# Patient Record
Sex: FEMALE | Race: WHITE | NOT HISPANIC OR LATINO | Employment: UNEMPLOYED | URBAN - NONMETROPOLITAN AREA
[De-identification: names, ages, dates, MRNs, and addresses within clinical notes are randomized per-mention and may not be internally consistent; named-entity substitution may affect disease eponyms.]

---

## 2017-08-15 ENCOUNTER — OFFICE VISIT (OUTPATIENT)
Dept: FAMILY MEDICINE CLINIC | Facility: CLINIC | Age: 27
End: 2017-08-15

## 2017-08-15 ENCOUNTER — LAB (OUTPATIENT)
Dept: LAB | Facility: OTHER | Age: 27
End: 2017-08-15

## 2017-08-15 VITALS
DIASTOLIC BLOOD PRESSURE: 80 MMHG | SYSTOLIC BLOOD PRESSURE: 126 MMHG | TEMPERATURE: 96 F | HEART RATE: 114 BPM | OXYGEN SATURATION: 98 % | HEIGHT: 65 IN | WEIGHT: 159 LBS | BODY MASS INDEX: 26.49 KG/M2

## 2017-08-15 DIAGNOSIS — F41.1 GAD (GENERALIZED ANXIETY DISORDER): Primary | ICD-10-CM

## 2017-08-15 DIAGNOSIS — N80.9 ENDOMETRIOSIS: ICD-10-CM

## 2017-08-15 DIAGNOSIS — F41.1 GAD (GENERALIZED ANXIETY DISORDER): ICD-10-CM

## 2017-08-15 DIAGNOSIS — E66.3 OVERWEIGHT (BMI 25.0-29.9): ICD-10-CM

## 2017-08-15 LAB
ALBUMIN SERPL-MCNC: 3.9 G/DL (ref 3.2–5.5)
ALBUMIN/GLOB SERPL: 1 G/DL (ref 1–3)
ALP SERPL-CCNC: 45 U/L (ref 15–121)
ALT SERPL W P-5'-P-CCNC: 32 U/L (ref 10–60)
ANION GAP SERPL CALCULATED.3IONS-SCNC: 10 MMOL/L (ref 5–15)
AST SERPL-CCNC: 24 U/L (ref 10–60)
BASOPHILS # BLD AUTO: 0.03 10*3/MM3 (ref 0–0.2)
BASOPHILS NFR BLD AUTO: 0.3 % (ref 0–2)
BILIRUB SERPL-MCNC: 0.7 MG/DL (ref 0.2–1)
BUN BLD-MCNC: 6 MG/DL (ref 8–25)
BUN/CREAT SERPL: 8.6 (ref 7–25)
CALCIUM SPEC-SCNC: 9.4 MG/DL (ref 8.4–10.8)
CHLORIDE SERPL-SCNC: 101 MMOL/L (ref 100–112)
CO2 SERPL-SCNC: 28 MMOL/L (ref 20–32)
CREAT BLD-MCNC: 0.7 MG/DL (ref 0.4–1.3)
DEPRECATED RDW RBC AUTO: 41.4 FL (ref 36.4–46.3)
EOSINOPHIL # BLD AUTO: 0.23 10*3/MM3 (ref 0–0.7)
EOSINOPHIL NFR BLD AUTO: 2.5 % (ref 0–7)
ERYTHROCYTE [DISTWIDTH] IN BLOOD BY AUTOMATED COUNT: 13.8 % (ref 11.5–14.5)
GFR SERPL CREATININE-BSD FRML MDRD: 100 ML/MIN/1.73 (ref 71–165)
GLOBULIN UR ELPH-MCNC: 3.8 GM/DL (ref 2.5–4.6)
GLUCOSE BLD-MCNC: 108 MG/DL (ref 70–100)
HCT VFR BLD AUTO: 38.6 % (ref 35–45)
HGB BLD-MCNC: 13.3 G/DL (ref 12–15.5)
LYMPHOCYTES # BLD AUTO: 1.53 10*3/MM3 (ref 0.6–4.2)
LYMPHOCYTES NFR BLD AUTO: 16.3 % (ref 10–50)
MCH RBC QN AUTO: 28.9 PG (ref 26.5–34)
MCHC RBC AUTO-ENTMCNC: 34.5 G/DL (ref 31.4–36)
MCV RBC AUTO: 83.9 FL (ref 80–98)
MONOCYTES # BLD AUTO: 0.69 10*3/MM3 (ref 0–0.9)
MONOCYTES NFR BLD AUTO: 7.4 % (ref 0–12)
NEUTROPHILS # BLD AUTO: 6.89 10*3/MM3 (ref 2–8.6)
NEUTROPHILS NFR BLD AUTO: 73.5 % (ref 37–80)
PLATELET # BLD AUTO: 305 10*3/MM3 (ref 150–450)
PMV BLD AUTO: 10 FL (ref 8–12)
POTASSIUM BLD-SCNC: 3.8 MMOL/L (ref 3.4–5.4)
PROT SERPL-MCNC: 7.7 G/DL (ref 6.7–8.2)
RBC # BLD AUTO: 4.6 10*6/MM3 (ref 3.77–5.16)
SODIUM BLD-SCNC: 139 MMOL/L (ref 134–146)
WBC NRBC COR # BLD: 9.37 10*3/MM3 (ref 3.2–9.8)

## 2017-08-15 PROCEDURE — 99214 OFFICE O/P EST MOD 30 MIN: CPT | Performed by: FAMILY MEDICINE

## 2017-08-15 PROCEDURE — 85025 COMPLETE CBC W/AUTO DIFF WBC: CPT | Performed by: FAMILY MEDICINE

## 2017-08-15 PROCEDURE — 84439 ASSAY OF FREE THYROXINE: CPT | Performed by: FAMILY MEDICINE

## 2017-08-15 PROCEDURE — 84443 ASSAY THYROID STIM HORMONE: CPT | Performed by: FAMILY MEDICINE

## 2017-08-15 PROCEDURE — 80053 COMPREHEN METABOLIC PANEL: CPT | Performed by: FAMILY MEDICINE

## 2017-08-15 PROCEDURE — 36415 COLL VENOUS BLD VENIPUNCTURE: CPT | Performed by: FAMILY MEDICINE

## 2017-08-15 RX ORDER — CITALOPRAM 20 MG/1
20 TABLET ORAL DAILY
Qty: 30 TABLET | Refills: 5 | Status: SHIPPED | OUTPATIENT
Start: 2017-08-15 | End: 2018-10-15

## 2017-08-15 NOTE — PROGRESS NOTES
Subjective   Chief Complaint   Patient presents with   • Establish Care     endometriosis     Janae Dumont is a 26 y.o. female.   Establish Care (endometriosis)    Here to establish care today    Would like to talk about anxiety and endometriosis    Depression   Visit Type: initial  Patient presents with the following symptoms: anhedonia, decreased concentration, fatigue, insomnia, irritability, memory impairment, muscle tension, nervousness/anxiety and obsessions.  Patient is not experiencing: depressed mood, excessive worry, feelings of hopelessness, feelings of worthlessness, nausea, palpitations, panic, restlessness, shortness of breath, suicidal ideas, suicidal planning, thoughts of death, weight gain and weight loss.  Frequency of symptoms: occasionally   Sleep per night: 6 hours  Sleep quality: fair  Nighttime awakenings: several  Risk factors: major life event    was previously started on wellbutrin  Voluntarily chose to not start new medication over 3 months ago    Would like a referral to gynecology for endometriosis  Previously followed by Dr Escalera    The following portions of the patient's history were reviewed and updated as appropriate: allergies, current medications, past family history, past medical history, past social history, past surgical history and problem list.    Review of Systems   Constitutional: Positive for irritability. Negative for appetite change, chills, fatigue, fever, weight gain and weight loss.   HENT: Negative for congestion, ear pain, rhinorrhea and sore throat.    Eyes: Negative for pain.   Respiratory: Negative for cough and shortness of breath.    Cardiovascular: Negative for chest pain and palpitations.   Gastrointestinal: Negative for abdominal pain, constipation, diarrhea and nausea.   Genitourinary: Positive for pelvic pain. Negative for dysuria.   Musculoskeletal: Negative for back pain, joint swelling and neck pain.   Skin: Negative for rash.   Neurological:  "Negative for dizziness and headaches.   Psychiatric/Behavioral: Positive for decreased concentration. Negative for suicidal ideas. The patient is nervous/anxious and has insomnia.        Objective   /80  Pulse 114  Temp 96 °F (35.6 °C)  Ht 65\" (165.1 cm)  Wt 159 lb (72.1 kg)  LMP 07/11/2017  SpO2 98%  BMI 26.46 kg/m2  Physical Exam   Constitutional: She is oriented to person, place, and time. She appears well-developed and well-nourished.   HENT:   Head: Normocephalic and atraumatic.   Eyes: Pupils are equal, round, and reactive to light.   Neck: Normal range of motion. Neck supple.   Cardiovascular: Regular rhythm and normal heart sounds.  Tachycardia present.    Pulmonary/Chest: Effort normal and breath sounds normal. No respiratory distress. She has no wheezes. She has no rales.   Abdominal: Soft. Bowel sounds are normal. There is tenderness in the suprapubic area.   Musculoskeletal: Normal range of motion.   Neurological: She is alert and oriented to person, place, and time.   Skin: Skin is warm and dry.   Psychiatric: Her mood appears anxious.   Nursing note and vitals reviewed.      Assessment/Plan   Problems Addressed this Visit     None      Visit Diagnoses     RENEE (generalized anxiety disorder)    -  Primary    Relevant Medications    citalopram (CELEXA) 20 MG tablet    Other Relevant Orders    Comprehensive Metabolic Panel    CBC & Differential (Completed)    TSH    T4, Free    CBC Auto Differential (Completed)    Endometriosis        Relevant Orders    Ambulatory Referral to Gynecology    Overweight (BMI 25.0-29.9)            Labs ordered    Medical release signed for medical records    Referred to georgi Talbert - needs a papsmear and evaluation for endometriosis    Start celexa    Recommended regular exercise, diet, weight loss    Recheck in 4 -6 weeks         "

## 2017-08-16 LAB
T4 FREE SERPL-MCNC: 1.12 NG/DL (ref 0.78–2.19)
TSH SERPL DL<=0.05 MIU/L-ACNC: 1.01 MIU/ML (ref 0.46–4.68)

## 2017-08-23 ENCOUNTER — OFFICE VISIT (OUTPATIENT)
Dept: OBSTETRICS AND GYNECOLOGY | Facility: CLINIC | Age: 27
End: 2017-08-23

## 2017-08-23 VITALS
RESPIRATION RATE: 14 BRPM | SYSTOLIC BLOOD PRESSURE: 122 MMHG | HEIGHT: 65 IN | WEIGHT: 158.6 LBS | BODY MASS INDEX: 26.42 KG/M2 | DIASTOLIC BLOOD PRESSURE: 78 MMHG | HEART RATE: 75 BPM

## 2017-08-23 DIAGNOSIS — Z87.42 HISTORY OF ENDOMETRIOSIS: ICD-10-CM

## 2017-08-23 DIAGNOSIS — Z87.42 HISTORY OF OVARIAN CYST: ICD-10-CM

## 2017-08-23 DIAGNOSIS — B96.89 BV (BACTERIAL VAGINOSIS): ICD-10-CM

## 2017-08-23 DIAGNOSIS — R10.2 PELVIC PAIN: Primary | ICD-10-CM

## 2017-08-23 DIAGNOSIS — N76.0 BV (BACTERIAL VAGINOSIS): ICD-10-CM

## 2017-08-23 PROCEDURE — 99213 OFFICE O/P EST LOW 20 MIN: CPT | Performed by: NURSE PRACTITIONER

## 2017-08-23 PROCEDURE — 87210 SMEAR WET MOUNT SALINE/INK: CPT | Performed by: NURSE PRACTITIONER

## 2017-08-23 RX ORDER — METRONIDAZOLE 500 MG/1
500 TABLET ORAL 2 TIMES DAILY
Qty: 14 TABLET | Refills: 0 | Status: SHIPPED | OUTPATIENT
Start: 2017-08-23 | End: 2017-08-30

## 2017-08-23 NOTE — PROGRESS NOTES
Subjective   Janae Dumont is a 26 y.o. female.     History of Present Illness   Pt presents with complaints of left sided pelvic pain that has progressively been getting worse. Pt states she was diagnosed with ovarian cysts and Endometriosis a few years ago. She was never put on anything to help control these. Pt has had BTL in 2012. Periods are very irregular since BTL. Last 5-7 days, heavy flow with extreme pain. Today rates pain 2/10. Describes pain as constant but worse some days than others. Deep, sharp, worsened by intercourse in certain positions. NSAIDs and tylenol do not improve pain.     Patient's last menstrual period was 07/11/2017 (approximate).  Pt notes that last period was a darker color and odor present. Refuses need for STI testing.     The following portions of the patient's history were reviewed and updated as appropriate: allergies, current medications, past family history, past medical history, past social history, past surgical history and problem list.    Review of Systems   Constitutional: Negative.    Respiratory: Negative.    Cardiovascular: Negative.    Gastrointestinal: Negative.  Negative for abdominal pain, blood in stool, constipation and diarrhea.        Denies painful bowel movements   Genitourinary: Positive for dyspareunia, menstrual problem, pelvic pain and vaginal discharge. Negative for dysuria, enuresis, frequency, vaginal bleeding and vaginal pain.   Neurological: Negative for dizziness, syncope, light-headedness and headaches.   Psychiatric/Behavioral: Negative for suicidal ideas. The patient is not nervous/anxious (controlled with Celexa).        Objective   Physical Exam   Constitutional: She is oriented to person, place, and time. She appears well-developed and well-nourished.   Cardiovascular: Normal rate, regular rhythm and normal heart sounds.    Pulmonary/Chest: Effort normal and breath sounds normal.   Abdominal: Soft. Normal appearance and bowel sounds are  normal. There is tenderness in the left lower quadrant.   Genitourinary: Uterus normal. There is no rash, tenderness or lesion on the right labia. There is no rash, tenderness or lesion on the left labia. Cervix exhibits no motion tenderness. Right adnexum displays no mass, no tenderness and no fullness. Left adnexum displays tenderness. Left adnexum displays no mass and no fullness. No erythema, tenderness or bleeding in the vagina. No foreign body in the vagina. Vaginal discharge (moderate amount of thin white vaginal discharge, odor noted, wet prep obtained) found.   Genitourinary Comments: Wet prep results: positive for clue cells TNTC, no yeast buds, hyphae or trichomonads. Evaluated by JOHNNY Harvey   Neurological: She is alert and oriented to person, place, and time.   Psychiatric: She has a normal mood and affect. Her behavior is normal.   Vitals reviewed.      Assessment/Plan   Janae was seen today for pelvic pain.    Diagnoses and all orders for this visit:    Pelvic pain  -     US Non-ob Transvaginal    History of endometriosis  -     US Non-ob Transvaginal    History of ovarian cyst  -     US Non-ob Transvaginal    BV (bacterial vaginosis)  -     metroNIDAZOLE (FLAGYL) 500 MG tablet; Take 1 tablet by mouth 2 (Two) Times a Day for 7 days. No alcohol up to 48 hours after last dose       Reviewed vulvar hygiene. Discussed importance in prevention of BV. Flagyl 500mg BID x 7 days no alcohol or intercourse while on medication up to 2 days after last dose. Pt verbalizes understanding.     Obtain TVUS for further evaluation of pelvic pain. Consider beginning OCP to regulate periods and reduce endometriosis and ovarian cysts. Pt is agreeable to plan of care. I will call with US results and discuss next steps and medications PRN. Consider surgical consult referral PRN. NSAIDs PRN for pain.

## 2017-09-01 RX ORDER — DESOGESTREL AND ETHINYL ESTRADIOL 21-5 (28)
1 KIT ORAL DAILY
Qty: 28 TABLET | Refills: 12 | Status: SHIPPED | OUTPATIENT
Start: 2017-09-01 | End: 2018-09-01

## 2017-09-01 NOTE — PROGRESS NOTES
US normal. Suspected Endometriosis. Pt agrees to start OCP. If no improvement consider surgical referral for endometrial ablation. She was instructed how to start her birth control.  It should be started on Sunday following the onset of her next cycle.  The potential for breakthrough bleeding for up to 3 cycles was also emphasized. Discussed what to do if 1 and 2 or more days of pills are missed. Patient verbalizes understanding. All questions were answered.

## 2018-10-15 ENCOUNTER — OFFICE VISIT (OUTPATIENT)
Dept: OBSTETRICS AND GYNECOLOGY | Facility: CLINIC | Age: 28
End: 2018-10-15

## 2018-10-15 VITALS
HEART RATE: 98 BPM | SYSTOLIC BLOOD PRESSURE: 140 MMHG | WEIGHT: 183.2 LBS | DIASTOLIC BLOOD PRESSURE: 80 MMHG | HEIGHT: 65 IN | RESPIRATION RATE: 16 BRPM | BODY MASS INDEX: 30.52 KG/M2

## 2018-10-15 DIAGNOSIS — R61 SWEATING INCREASE: ICD-10-CM

## 2018-10-15 DIAGNOSIS — Z01.419 ENCOUNTER FOR GYNECOLOGICAL EXAMINATION WITH PAPANICOLAOU SMEAR OF CERVIX: Primary | ICD-10-CM

## 2018-10-15 DIAGNOSIS — R68.82 LOW LIBIDO: ICD-10-CM

## 2018-10-15 LAB — TSH SERPL DL<=0.05 MIU/L-ACNC: 1.21 MIU/ML (ref 0.46–4.68)

## 2018-10-15 PROCEDURE — 84443 ASSAY THYROID STIM HORMONE: CPT | Performed by: NURSE PRACTITIONER

## 2018-10-15 PROCEDURE — G0123 SCREEN CERV/VAG THIN LAYER: HCPCS | Performed by: NURSE PRACTITIONER

## 2018-10-15 PROCEDURE — 99395 PREV VISIT EST AGE 18-39: CPT | Performed by: NURSE PRACTITIONER

## 2018-10-15 PROCEDURE — 88141 CYTOPATH C/V INTERPRET: CPT | Performed by: PATHOLOGY

## 2018-10-15 PROCEDURE — 84403 ASSAY OF TOTAL TESTOSTERONE: CPT | Performed by: NURSE PRACTITIONER

## 2018-10-15 PROCEDURE — 88142 CYTOPATH C/V THIN LAYER: CPT | Performed by: NURSE PRACTITIONER

## 2018-10-15 PROCEDURE — 84402 ASSAY OF FREE TESTOSTERONE: CPT | Performed by: NURSE PRACTITIONER

## 2018-10-16 NOTE — PROGRESS NOTES
Subjective   Chief Complaint   Patient presents with   • Gynecologic Exam     well woman annual visit     Janae Dumont is a 27 y.o. year old  presenting to be seen for her annual exam.  Today she continues to complain of pain with her periods. US in 2017 was normal. She has family hx of endometriosis. She had agreed to OCPs. Today she states she took them for a very short time but didn't have any improvement in the flow or cramping with periods. We discussed ablation but this may not resolve pelvic pain but may improve her flow during periods. She would rather improve the pain as she states the flow is tolerable. She declines any further treatment options at this time. She will continue to monitor.     She has had recent heartburn and had only had heartburn in the past with pregnancies. She has had a BTL. UPT negative. Encouraged diet monitoring and follow up with PCP for further evaluation.     Pt also complains of low libido and sweating. She states she has no desire. Denies sexual dysfunction. She stopped Celexa 6 months to 1 year ago and didn't see any improvement in this. She has never taken Wellbutrin or had testosterone checked. I recommend checking thyroid and testosterone. If both normal, consider Wellbutrin.     Patient's last menstrual period was 10/08/2018 (approximate).    OB History      Para Term  AB Living    3 3            SAB TAB Ectopic Molar Multiple Live Births                         Current birth control method: tubal ligation.    She is sexually active.  In the past 12 months there has not been new sexual partners.  Condoms are not typically used.  She would not like to be screened for STD's at today's exam.     Past 6 month menstrual history:    Cycle Frequency: regular, predictable and consistent every 28 - 32 days   Menstrual cycle character: flow is typically moderately heavy   Cycle Duration: 3 - 5   Number of heavy days of flows: 3   Dysmenorrhea: severe  "  PMS: moderate   Intermenstrual bleeding present: no   Post-coital bleeding present: no     She exercises regularly: no.  She wears her seat belt:yes.  She has concerns about domestic violence: no.  Last colonoscopy: Never  Last DEXA: Never  Last MMG: Never  Last pap: 2/26/14  History of abnormal PAP: No    The following portions of the patient's history were reviewed and updated as appropriate:problem list, current medications, allergies, past family history, past medical history, past social history and past surgical history.    Smoking status: Never Smoker                                                              Smokeless tobacco: Never Used                        History   Alcohol Use No      Review of Systems   Constitutional: Negative.    Respiratory: Negative.    Cardiovascular: Negative.    Gastrointestinal: Negative.  Negative for constipation and diarrhea.   Endocrine: Negative.  Negative for cold intolerance and heat intolerance.   Genitourinary: Positive for pelvic pain (chronic). Negative for dyspareunia, menstrual problem, vaginal bleeding, vaginal discharge and vaginal pain.        Low libido   Skin: Negative.    Neurological: Negative for dizziness, syncope, light-headedness and headaches.   Psychiatric/Behavioral: Negative for suicidal ideas. The patient is not nervous/anxious (history of but states it is self managed right now).          Objective   /80 (BP Location: Right arm, Patient Position: Sitting, Cuff Size: Adult)   Pulse 98   Resp 16   Ht 165.1 cm (65\")   Wt 83.1 kg (183 lb 3.2 oz)   LMP 10/08/2018 (Approximate)   Breastfeeding? No   BMI 30.49 kg/m²     General:  well developed; well nourished  no acute distress   Skin:  No suspicious lesions seen   Thyroid: not examined   Breasts:  Examined in supine position  Symmetric without masses or skin dimpling  Nipples normal without inversion, lesions or discharge  There are no palpable axillary nodes  Fibrocystic changes are " present both breasts without a discrete mass   Abdomen: soft, non-tender; no masses  no umbilical or inginual hernias are present  no hepato-splenomegaly  Normal findings: bowel sounds normal   Cardiac: Heart sounds are normal.  Regular rate and rhythm without murmur, gallop or rub.   Resp: Normal expansion.  Clear to auscultation.  No rales, rhonchi, or wheezing.   Psych: alert,oriented, in NAD with a full range of affect, normal behavior and no psychotic features   Pelvis: Uterus:  Clinical staff was present for exam  External genitalia:  normal appearance of the external genitalia including Bartholin's and Bibo's glands.  :  urethral meatus normal;  Vaginal:  normal pink mucosa without prolapse or lesions  Cervix:  normal appearance.  Uterus:  normal size, shape and consistency and tender chronic  Adnexa:  normal bimanual exam of the adnexa.tender bilateral chronic     Lab Review   TSH and testosterone     Imaging  No data reviewed       Diagnoses and all orders for this visit:    Encounter for gynecological examination with Papanicolaou smear of cervix  -     Liquid-based Pap Smear, Screening  Pap results: I will send card in mail or call if abnormal. RTC annually for well woman exams.     Low libido  -     Testosterone, Free, Total  -     TSH    Sweating increase  -     TSH  Obtain the labs above. If WNL, consider Wellbutrin for mood and positive sexual side effects. Will replace testosterone through BHRT only if needed based on serum labs. I spent 10 minutes discussing not medical ways of increasing sex drive: dating, planning, relaxation techniques. Pt voices understanding. All questions answered. Will plan follow up based on plan created from lab results.     This note was electronically signed.    Argentina Fortune, APRN  October 16, 2018

## 2018-10-17 ENCOUNTER — OFFICE VISIT (OUTPATIENT)
Dept: FAMILY MEDICINE CLINIC | Facility: CLINIC | Age: 28
End: 2018-10-17

## 2018-10-17 VITALS
DIASTOLIC BLOOD PRESSURE: 90 MMHG | BODY MASS INDEX: 30.39 KG/M2 | SYSTOLIC BLOOD PRESSURE: 138 MMHG | OXYGEN SATURATION: 99 % | TEMPERATURE: 97.4 F | HEIGHT: 65 IN | HEART RATE: 99 BPM | WEIGHT: 182.4 LBS

## 2018-10-17 DIAGNOSIS — E66.09 CLASS 1 OBESITY DUE TO EXCESS CALORIES WITHOUT SERIOUS COMORBIDITY WITH BODY MASS INDEX (BMI) OF 30.0 TO 30.9 IN ADULT: ICD-10-CM

## 2018-10-17 DIAGNOSIS — Z23 INFLUENZA VACCINE NEEDED: ICD-10-CM

## 2018-10-17 DIAGNOSIS — M79.18 MUSCULOSKELETAL PAIN: ICD-10-CM

## 2018-10-17 DIAGNOSIS — I10 ESSENTIAL HYPERTENSION: Primary | ICD-10-CM

## 2018-10-17 LAB
TESTOST FREE SERPL-MCNC: 0.3 PG/ML (ref 0–4.2)
TESTOST SERPL-MCNC: 5 NG/DL (ref 8–48)

## 2018-10-17 PROCEDURE — 99214 OFFICE O/P EST MOD 30 MIN: CPT | Performed by: FAMILY MEDICINE

## 2018-10-17 PROCEDURE — 90471 IMMUNIZATION ADMIN: CPT | Performed by: FAMILY MEDICINE

## 2018-10-17 PROCEDURE — 90674 CCIIV4 VAC NO PRSV 0.5 ML IM: CPT | Performed by: FAMILY MEDICINE

## 2018-10-17 RX ORDER — METHOCARBAMOL 500 MG/1
500 TABLET, FILM COATED ORAL 3 TIMES DAILY PRN
Qty: 30 TABLET | Refills: 0 | Status: SHIPPED | OUTPATIENT
Start: 2018-10-17 | End: 2018-11-19 | Stop reason: SDUPTHER

## 2018-10-17 RX ORDER — LISINOPRIL 10 MG/1
10 TABLET ORAL DAILY
Qty: 90 TABLET | Refills: 0 | Status: SHIPPED | OUTPATIENT
Start: 2018-10-17 | End: 2020-10-22

## 2018-10-17 NOTE — PROGRESS NOTES
"Subjective   Chief Complaint   Patient presents with   • Blood Pressure Problems     Janae Dumont is a 27 y.o. female.   Blood Pressure Problems    History of Present Illness     Presents today with complaints of elevated blood pressure  She was seen at urgent care and told that she needs to be evaluated in her PCP office for possible hypertension  C/o headaches, fatigue  Denies blurry vision  Family history of hypertension    Complaints of left upper back and shoulder pain  Used biofreeze topical - but temporary relief  Started several months ago  Gradually worsened in the last few weeks    The following portions of the patient's history were reviewed and updated as appropriate: allergies, current medications, past family history, past medical history, past social history, past surgical history and problem list.    Review of Systems   Constitutional: Positive for fatigue. Negative for appetite change, chills and fever.   HENT: Negative for congestion, ear pain, rhinorrhea and sore throat.    Eyes: Negative for pain.   Respiratory: Negative for cough and shortness of breath.    Cardiovascular: Negative for chest pain and palpitations.   Gastrointestinal: Negative for abdominal pain, constipation, diarrhea and nausea.   Genitourinary: Negative for dysuria.   Musculoskeletal: Positive for back pain. Negative for joint swelling and neck pain.   Skin: Negative for rash.   Neurological: Positive for headaches. Negative for dizziness.       Objective   /90   Pulse 99   Temp 97.4 °F (36.3 °C)   Ht 165.1 cm (65\")   Wt 82.7 kg (182 lb 6.4 oz)   LMP 10/08/2018 (Approximate)   SpO2 99%   BMI 30.35 kg/m²   Physical Exam   Constitutional: She is oriented to person, place, and time. She appears well-developed and well-nourished.   HENT:   Head: Normocephalic and atraumatic.   Eyes: Pupils are equal, round, and reactive to light.   Neck: Normal range of motion. Neck supple.   Cardiovascular: Normal rate, regular " rhythm and normal heart sounds.    Pulmonary/Chest: Effort normal and breath sounds normal. No respiratory distress. She has no wheezes. She has no rales.   Abdominal: Soft. Bowel sounds are normal.   Musculoskeletal: Normal range of motion.        Arms:  Neurological: She is alert and oriented to person, place, and time.   Skin: Skin is warm and dry.   Psychiatric: She has a normal mood and affect.   Nursing note and vitals reviewed.      Assessment/Plan   Problems Addressed this Visit        Cardiovascular and Mediastinum    Essential hypertension - Primary    Relevant Medications    lisinopril (PRINIVIL,ZESTRIL) 10 MG tablet       Digestive    Class 1 obesity due to excess calories without serious comorbidity with body mass index (BMI) of 30.0 to 30.9 in adult      Other Visit Diagnoses     Influenza vaccine needed        Relevant Orders    Flucelvax Quad=>4Years ([id number]) (Completed)    Musculoskeletal pain            Start robaxin  Recommended heat  Alternative may be topical agent  Recommended massage and exercises    Start lisinopril for blood pressure  Consider lab work if symptoms do not improve    Patient's Body mass index is 30.35 kg/m². BMI is above normal parameters. Recommendations include: exercise counseling and nutrition counseling.    Flu vaccine today    Recheck in 6 weeks

## 2018-10-19 ENCOUNTER — DOCUMENTATION (OUTPATIENT)
Dept: OBSTETRICS AND GYNECOLOGY | Facility: CLINIC | Age: 28
End: 2018-10-19

## 2018-10-19 NOTE — PROGRESS NOTES
Testosterone is low. TSH WNL. I recommend replacement of  Testosterone. The cost may be concerning to pt but she wants to give it a try. Testosterone 1mg apply 1 gm to inner thigh once daily. Script called to [facility] in [place]. Pt voices understanding on how to use. We will follow up in 8 weeks. If not improving libido or cost is still a factor, consider Wellbutrin.

## 2018-10-23 LAB
GEN CATEG CVX/VAG CYTO-IMP: ABNORMAL
LAB AP CASE REPORT: ABNORMAL
LAB AP GYN ADDITIONAL INFORMATION: ABNORMAL
PATH INTERP SPEC-IMP: ABNORMAL
STAT OF ADQ CVX/VAG CYTO-IMP: ABNORMAL

## 2018-10-26 DIAGNOSIS — R87.613 HGSIL (HIGH GRADE SQUAMOUS INTRAEPITHELIAL LESION) ON PAP SMEAR OF CERVIX: Primary | ICD-10-CM

## 2018-11-19 RX ORDER — METHOCARBAMOL 500 MG/1
TABLET, FILM COATED ORAL
Qty: 30 TABLET | Refills: 0 | Status: SHIPPED | OUTPATIENT
Start: 2018-11-19 | End: 2020-10-22

## 2018-12-19 RX ORDER — METHOCARBAMOL 500 MG/1
TABLET, FILM COATED ORAL
Qty: 30 TABLET | Refills: 0 | OUTPATIENT
Start: 2018-12-19

## 2020-11-05 ENCOUNTER — LAB (OUTPATIENT)
Dept: LAB | Facility: OTHER | Age: 30
End: 2020-11-05

## 2020-11-05 PROCEDURE — U0003 INFECTIOUS AGENT DETECTION BY NUCLEIC ACID (DNA OR RNA); SEVERE ACUTE RESPIRATORY SYNDROME CORONAVIRUS 2 (SARS-COV-2) (CORONAVIRUS DISEASE [COVID-19]), AMPLIFIED PROBE TECHNIQUE, MAKING USE OF HIGH THROUGHPUT TECHNOLOGIES AS DESCRIBED BY CMS-2020-01-R: HCPCS | Performed by: PHYSICIAN ASSISTANT
